# Patient Record
Sex: FEMALE | Race: BLACK OR AFRICAN AMERICAN | NOT HISPANIC OR LATINO | URBAN - METROPOLITAN AREA
[De-identification: names, ages, dates, MRNs, and addresses within clinical notes are randomized per-mention and may not be internally consistent; named-entity substitution may affect disease eponyms.]

---

## 2022-12-30 ENCOUNTER — EMERGENCY (EMERGENCY)
Facility: HOSPITAL | Age: 42
LOS: 1 days | Discharge: HOME | End: 2022-12-30
Attending: EMERGENCY MEDICINE
Payer: COMMERCIAL

## 2022-12-30 VITALS
WEIGHT: 162.04 LBS | RESPIRATION RATE: 16 BRPM | HEART RATE: 96 BPM | TEMPERATURE: 98 F | OXYGEN SATURATION: 100 % | DIASTOLIC BLOOD PRESSURE: 73 MMHG | SYSTOLIC BLOOD PRESSURE: 110 MMHG | HEIGHT: 65 IN

## 2022-12-30 LAB
ALBUMIN SERPL ELPH-MCNC: 3.3 G/DL — SIGNIFICANT CHANGE UP (ref 3.3–5.2)
ALP SERPL-CCNC: 108 U/L — SIGNIFICANT CHANGE UP (ref 40–120)
ALT FLD-CCNC: 12 U/L — SIGNIFICANT CHANGE UP
ANION GAP SERPL CALC-SCNC: 11 MMOL/L — SIGNIFICANT CHANGE UP (ref 5–17)
APPEARANCE UR: CLEAR — SIGNIFICANT CHANGE UP
APTT BLD: 30.4 SEC — SIGNIFICANT CHANGE UP (ref 27.5–35.5)
AST SERPL-CCNC: 16 U/L — SIGNIFICANT CHANGE UP
BACTERIA # UR AUTO: ABNORMAL
BASOPHILS # BLD AUTO: 0.03 K/UL — SIGNIFICANT CHANGE UP (ref 0–0.2)
BASOPHILS NFR BLD AUTO: 0.3 % — SIGNIFICANT CHANGE UP (ref 0–2)
BILIRUB SERPL-MCNC: 0.3 MG/DL — LOW (ref 0.4–2)
BILIRUB UR-MCNC: NEGATIVE — SIGNIFICANT CHANGE UP
BLD GP AB SCN SERPL QL: SIGNIFICANT CHANGE UP
BUN SERPL-MCNC: 7.1 MG/DL — LOW (ref 8–20)
CALCIUM SERPL-MCNC: 9 MG/DL — SIGNIFICANT CHANGE UP (ref 8.4–10.5)
CHLORIDE SERPL-SCNC: 97 MMOL/L — SIGNIFICANT CHANGE UP (ref 96–108)
CO2 SERPL-SCNC: 25 MMOL/L — SIGNIFICANT CHANGE UP (ref 22–29)
COLOR SPEC: YELLOW — SIGNIFICANT CHANGE UP
CREAT SERPL-MCNC: 0.6 MG/DL — SIGNIFICANT CHANGE UP (ref 0.5–1.3)
DIFF PNL FLD: ABNORMAL
EGFR: 116 ML/MIN/1.73M2 — SIGNIFICANT CHANGE UP
EOSINOPHIL # BLD AUTO: 0 K/UL — SIGNIFICANT CHANGE UP (ref 0–0.5)
EOSINOPHIL NFR BLD AUTO: 0 % — SIGNIFICANT CHANGE UP (ref 0–6)
EPI CELLS # UR: SIGNIFICANT CHANGE UP
GLUCOSE SERPL-MCNC: 88 MG/DL — SIGNIFICANT CHANGE UP (ref 70–99)
GLUCOSE UR QL: NEGATIVE MG/DL — SIGNIFICANT CHANGE UP
HCG UR QL: NEGATIVE — SIGNIFICANT CHANGE UP
HCT VFR BLD CALC: 26.4 % — LOW (ref 34.5–45)
HGB BLD-MCNC: 8.7 G/DL — LOW (ref 11.5–15.5)
IMM GRANULOCYTES NFR BLD AUTO: 0.6 % — SIGNIFICANT CHANGE UP (ref 0–0.9)
INR BLD: 1.36 RATIO — HIGH (ref 0.88–1.16)
KETONES UR-MCNC: NEGATIVE — SIGNIFICANT CHANGE UP
LEUKOCYTE ESTERASE UR-ACNC: NEGATIVE — SIGNIFICANT CHANGE UP
LYMPHOCYTES # BLD AUTO: 1.36 K/UL — SIGNIFICANT CHANGE UP (ref 1–3.3)
LYMPHOCYTES # BLD AUTO: 13 % — SIGNIFICANT CHANGE UP (ref 13–44)
MCHC RBC-ENTMCNC: 29.2 PG — SIGNIFICANT CHANGE UP (ref 27–34)
MCHC RBC-ENTMCNC: 33 GM/DL — SIGNIFICANT CHANGE UP (ref 32–36)
MCV RBC AUTO: 88.6 FL — SIGNIFICANT CHANGE UP (ref 80–100)
MONOCYTES # BLD AUTO: 0.6 K/UL — SIGNIFICANT CHANGE UP (ref 0–0.9)
MONOCYTES NFR BLD AUTO: 5.7 % — SIGNIFICANT CHANGE UP (ref 2–14)
NEUTROPHILS # BLD AUTO: 8.45 K/UL — HIGH (ref 1.8–7.4)
NEUTROPHILS NFR BLD AUTO: 80.4 % — HIGH (ref 43–77)
NITRITE UR-MCNC: NEGATIVE — SIGNIFICANT CHANGE UP
PH UR: 8 — SIGNIFICANT CHANGE UP (ref 5–8)
PLATELET # BLD AUTO: 492 K/UL — HIGH (ref 150–400)
POTASSIUM SERPL-MCNC: 4 MMOL/L — SIGNIFICANT CHANGE UP (ref 3.5–5.3)
POTASSIUM SERPL-SCNC: 4 MMOL/L — SIGNIFICANT CHANGE UP (ref 3.5–5.3)
PROT SERPL-MCNC: 8.4 G/DL — SIGNIFICANT CHANGE UP (ref 6.6–8.7)
PROT UR-MCNC: 15
PROTHROM AB SERPL-ACNC: 15.8 SEC — HIGH (ref 10.5–13.4)
RBC # BLD: 2.98 M/UL — LOW (ref 3.8–5.2)
RBC # FLD: 14.4 % — SIGNIFICANT CHANGE UP (ref 10.3–14.5)
RBC CASTS # UR COMP ASSIST: SIGNIFICANT CHANGE UP /HPF (ref 0–4)
SARS-COV-2 RNA SPEC QL NAA+PROBE: SIGNIFICANT CHANGE UP
SODIUM SERPL-SCNC: 133 MMOL/L — LOW (ref 135–145)
SP GR SPEC: 1.01 — SIGNIFICANT CHANGE UP (ref 1.01–1.02)
UROBILINOGEN FLD QL: 4 MG/DL
WBC # BLD: 10.5 K/UL — SIGNIFICANT CHANGE UP (ref 3.8–10.5)
WBC # FLD AUTO: 10.5 K/UL — SIGNIFICANT CHANGE UP (ref 3.8–10.5)
WBC UR QL: SIGNIFICANT CHANGE UP /HPF (ref 0–5)

## 2022-12-30 PROCEDURE — 99284 EMERGENCY DEPT VISIT MOD MDM: CPT

## 2022-12-30 PROCEDURE — 76830 TRANSVAGINAL US NON-OB: CPT | Mod: 26

## 2022-12-30 PROCEDURE — 76856 US EXAM PELVIC COMPLETE: CPT | Mod: 26,59

## 2022-12-30 RX ORDER — SODIUM CHLORIDE 9 MG/ML
1000 INJECTION INTRAMUSCULAR; INTRAVENOUS; SUBCUTANEOUS ONCE
Refills: 0 | Status: COMPLETED | OUTPATIENT
Start: 2022-12-30 | End: 2022-12-30

## 2022-12-30 RX ORDER — ONDANSETRON 8 MG/1
4 TABLET, FILM COATED ORAL ONCE
Refills: 0 | Status: COMPLETED | OUTPATIENT
Start: 2022-12-30 | End: 2022-12-30

## 2022-12-30 RX ORDER — KETOROLAC TROMETHAMINE 30 MG/ML
30 SYRINGE (ML) INJECTION ONCE
Refills: 0 | Status: DISCONTINUED | OUTPATIENT
Start: 2022-12-30 | End: 2022-12-30

## 2022-12-30 RX ORDER — PIPERACILLIN AND TAZOBACTAM 4; .5 G/20ML; G/20ML
3.38 INJECTION, POWDER, LYOPHILIZED, FOR SOLUTION INTRAVENOUS ONCE
Refills: 0 | Status: COMPLETED | OUTPATIENT
Start: 2022-12-30 | End: 2022-12-30

## 2022-12-30 RX ORDER — MORPHINE SULFATE 50 MG/1
4 CAPSULE, EXTENDED RELEASE ORAL ONCE
Refills: 0 | Status: DISCONTINUED | OUTPATIENT
Start: 2022-12-30 | End: 2022-12-30

## 2022-12-30 RX ORDER — ACETAMINOPHEN 500 MG
650 TABLET ORAL ONCE
Refills: 0 | Status: COMPLETED | OUTPATIENT
Start: 2022-12-30 | End: 2022-12-30

## 2022-12-30 RX ADMIN — ONDANSETRON 4 MILLIGRAM(S): 8 TABLET, FILM COATED ORAL at 20:30

## 2022-12-30 RX ADMIN — Medication 650 MILLIGRAM(S): at 20:41

## 2022-12-30 RX ADMIN — Medication 30 MILLIGRAM(S): at 23:46

## 2022-12-30 RX ADMIN — MORPHINE SULFATE 4 MILLIGRAM(S): 50 CAPSULE, EXTENDED RELEASE ORAL at 20:20

## 2022-12-30 RX ADMIN — SODIUM CHLORIDE 1000 MILLILITER(S): 9 INJECTION INTRAMUSCULAR; INTRAVENOUS; SUBCUTANEOUS at 20:30

## 2022-12-30 RX ADMIN — Medication 650 MILLIGRAM(S): at 22:54

## 2022-12-30 RX ADMIN — PIPERACILLIN AND TAZOBACTAM 3.38 GRAM(S): 4; .5 INJECTION, POWDER, LYOPHILIZED, FOR SOLUTION INTRAVENOUS at 20:28

## 2022-12-30 RX ADMIN — SODIUM CHLORIDE 1000 MILLILITER(S): 9 INJECTION INTRAMUSCULAR; INTRAVENOUS; SUBCUTANEOUS at 16:14

## 2022-12-30 RX ADMIN — MORPHINE SULFATE 4 MILLIGRAM(S): 50 CAPSULE, EXTENDED RELEASE ORAL at 16:14

## 2022-12-30 RX ADMIN — PIPERACILLIN AND TAZOBACTAM 200 GRAM(S): 4; .5 INJECTION, POWDER, LYOPHILIZED, FOR SOLUTION INTRAVENOUS at 17:00

## 2022-12-30 RX ADMIN — ONDANSETRON 4 MILLIGRAM(S): 8 TABLET, FILM COATED ORAL at 22:57

## 2022-12-30 NOTE — ED STATDOCS - PROGRESS NOTE DETAILS
MAUDE- seen by intake doc, agree with plan, seen by obgyn team, awaiting us POLO- Seen by obgyn team, us appears well, expressed concerns as outpatient ct showed multiple abscess of the llq, will obtain ct with iv contrast in order to further evaluate the abscess as unable to obtain outpt ct scan SUKHDEEP BALDWIN: sign out SUKHDEEP SANTORO   pt with still mild lower abdominal pain, CT results reviewed with patient, no abscess seen on CT, trace ascites to lower pelvic area ? rupture cyst. advised on need for close follow up with OBGYN and advised on strict return precautions, pt verbalizes understanding

## 2022-12-30 NOTE — ED ADULT NURSE REASSESSMENT NOTE - NS ED NURSE REASSESS COMMENT FT1
Assumed care of pt at 2315, POC reviewed. Pt with slight abd pain, denies HA dizziness, sob, N/V/D, chills, CP, palpitations at this time. Pt AOx4, speaking coherently, respirations even and unlabored on RA, skin warm and dry.

## 2022-12-30 NOTE — CONSULT NOTE ADULT - SUBJECTIVE AND OBJECTIVE BOX
MYLENE ORONA is a 42y  who presented to the ED for for evaluation in the setting of LLQ pain and CT finding of possible collection int eh left adnexa, reactive lymph nodes, and a 3cm dermoid cyst. Patient has been seen at both [facility] and [facility] this month for evaluation for the abdominal pain that has been associated with heavier periods. LMP 22. [facility] and was sent for CT for evaluation of the pain due to acute worsening per patient. The patient denies any associated fevers chills, nausea, vomiting She notes that she did not take any pain medication at home before coming in; however, she did just run out of the oxycodone that was prescribed at [facility] previously for the pain. The  GYN consulted for further evaluation of cyst vs. abscess.      OB history:  x3, PE in prior pregnancy in  (no longer on AC)  GYN history: LMP 22, + fibroids, denies STD hx, "inflammatory cells" on recent pap smear, sexually active with 1 male partner  Past medical history: anemia  Past surgical history: denies  Medications: IV iron infusions  Allergies: nkda    Physical Exam  T(C): 36.8 (22 @ 13:27), Max: 36.8 (-22 @ 13:27)  HR: 96 (-30-22 @ 13:27) (96 - 96)  BP: 110/73 (-30-22 @ 13:27) (110/73 - 110/73)  RR: 16 (-30-22 @ 13:27) (16 - 16)  SpO2: 100% (-30-22 @ 13:27) (100% - 100%)    General: alert and oriented x3, no acute distress  Abdominal: no distension, no scarring, no bruising.  Mild tender to deep palpation LLQ. No guarding, no rebound tenderness.  Pelvic: normal external genitalia, no active vaginal bleeding, blood in vaginal vault (menses started yesterday), no malodorous discharge No adnexal massess appreciated on exam, no CVA tenderness          Labs:                        8.7    10.50 )-----------( 492      ( 30 Dec 2022 16:17 )             26.4         133<L>  |  97  |  7.1<L>  ----------------------------<  88  4.0   |  25.0  |  0.60    Ca    9.0      30 Dec 2022 16:17    TPro  8.4  /  Alb  3.3  /  TBili  0.3<L>  /  DBili  x   /  AST  16  /  ALT  12  /  AlkPhos  108  12-30    PT/INR - ( 30 Dec 2022 16:17 )   PT: 15.8 sec;   INR: 1.36 ratio         PTT - ( 30 Dec 2022 16:17 )  PTT:30.4 sec

## 2022-12-30 NOTE — ED STATDOCS - CLINICAL SUMMARY MEDICAL DECISION MAKING FREE TEXT BOX
Pt with lower abdominal pain, vaginal bleeding with diagnosed fibroid sent in by Dr. Roberto Pitt for palpable mass on left of abdomen. Will check labs, CT scan, and US.

## 2022-12-30 NOTE — ED STATDOCS - NSFOLLOWUPINSTRUCTIONS_ED_ALL_ED_FT
please follow with OBGYN closely   bring all results with you   new or worsening symptoms return to the ED including and not limited to fevers chills, worsening abdominal pain bloating changes to bowel movments, vaginal bleeding or discharge.   you are also anemic, please resume daily iron supplementation     Abdominal Pain    Many things can cause abdominal pain. Many times, abdominal pain is not caused by a disease and will improve without treatment. Your health care provider will do a physical exam to determine if there is a dangerous cause of your pain; blood tests and imaging may help determine the cause of your pain. However, in many cases, no cause may be found and you may need further testing as an outpatient. Monitor your abdominal pain for any changes.     SEEK IMMEDIATE MEDICAL CARE IF YOU HAVE ANY OF THE FOLLOWING SYMPTOMS: worsening abdominal pain, uncontrollable vomiting, profuse diarrhea, inability to have bowel movements or pass gas, black or bloody stools, fever accompanying chest pain or back pain, or fainting. These symptoms may represent a serious problem that is an emergency. Do not wait to see if the symptoms will go away. Get medical help right away. Call 911 and do not drive yourself to the hospital.    Anemia    Anemia is a condition in which the concentration of red blood cells or hemoglobin in the blood is below normal. Hemoglobin is a substance in red blood cells that carries oxygen to the tissues of the body. Anemia results in not enough oxygen reaching these tissues which can cause symptoms such as weakness, dizziness/lightheadedness, shortness of breath, chest pain, paleness, or nausea. The cause of your anemia may or may not be determined immediately. If your hemoglobin was dangerously low, you may have received a blood transfusion. Usually reactions to transfusions occur immediately but monitor yourself for any fevers, rash, or shortness of breath.    SEEK IMMEDIATE MEDICAL CARE IF YOU HAVE ANY OF THE FOLLOWING SYMPTOMS: extreme weakness/chest pain/shortness of breath, black or bloody stools, vomiting blood, fainting, fever, or any signs of dehydration.

## 2022-12-30 NOTE — CONSULT NOTE ADULT - ASSESSMENT
MYLENE ORONA is a 42y  who presented to the ED for for evaluation in the setting of LLQ pain and CT finding of possible collection int eh left adnexa, reactive lymph nodes, and a 3cm dermoid cyst.     - VSS; afebrile  - WBC 10.5 - TOA unlikely - do not recommend continuation of ZOsyn  - ovarian cyst <5cm; less likely to cause torsion  - Clinical picture not convincing for torsion; however, will f/u TVUS to confirm retained flow to ovaries  - Do not recommend CT; one already done today and have report  - Pending TVUS; patient can follow up with outpt gyn for further management    D/w Dr. Rodriguez    MYLENE ORONA is a 42y  who presented to the ED for for evaluation in the setting of LLQ pain and CT finding of possible collection int eh left adnexa, reactive lymph nodes, and a 3cm dermoid cyst.     - VSS; afebrile  - WBC 10.5 - TOA unlikely - do not recommend continuation of ZOsyn  - ovarian cyst <5cm; less likely to cause torsion  - Clinical picture not convincing for torsion; however, will f/u TVUS to confirm retained flow to ovaries  - Do not recommend CT; one already done today and have report  - Pending TVUS; patient can follow up with outpt gyn for further management    D/w Dr. Rodriguez       ------------22 @ 20:52    TVUS resulted, consistent with known ovarian cyst <5cm  Dispo: return precautions provided, patient to follow up with outpt gyn for further management of ovarian cyst; rest of care per E.D. team   MYLENE ORONA is a 42y  who presented to the ED for for evaluation in the setting of LLQ pain and CT finding of possible collection int eh left adnexa, reactive lymph nodes, and a 3cm dermoid cyst.     - VSS; afebrile  - WBC 10.5 - TOA unlikely - do not recommend continuation of ZOsyn  - ovarian cyst <5cm; less likely to cause torsion  - Clinical picture not convincing for torsion; however, will f/u TVUS to confirm retained flow to ovaries  - Do not recommend CT; one already done today and have report  - Pending TVUS; patient can follow up with outpt gyn for further management    D/w Dr. Rodriguez       ------------22 @ 20:52    TVUS resulted, consistent with known ovarian cyst <5cm  repeat CT shows trace ascites, possibly ruptured ovarian cyst  VSS  Dispo: return precautions provided, patient to follow up with outpt gyn for further management of ovarian cyst; rest of care per E.D. team

## 2022-12-30 NOTE — ED ADULT TRIAGE NOTE - CHIEF COMPLAINT QUOTE
"I was sent in by an obgyn for an ovarian cyst. that I need to see some doctor her for something. to be honest he was talking so fast and none of it made sense so Im not really sure why I am here."

## 2022-12-30 NOTE — ED ADULT NURSE NOTE - OBJECTIVE STATEMENT
pt received in [facility].  Patient A&Ox4 complaining of L pelvic region pain. pt with bleeding and pain since september with IUD.  Was told she has a fibroid, [facility] Had ct this morning and was told she has fobroids, but also heard them say "cancer". No bleeding at this time.  NAD noted, respirations even and unlabored.  Safety precautions in place.  Plan of care explained, pt verbalized understanding.

## 2022-12-30 NOTE — ED ADULT NURSE NOTE - CAS EDP DISCH TYPE
Bedside report received from NOC RN. Assumed care of pt. Pt awake, laying in bed. A/Ox4, VSS. No concerns, complaints or distress. Pt educated to call before getting out of bed. POC reviewed and white board updated. Tele box on. SR 69 on the monitor. Call light in reach. Bed locked in lowest position with 2 upper bed rails up. Bed alarm on.     uto

## 2022-12-30 NOTE — ED STATDOCS - ATTENDING APP SHARED VISIT CONTRIBUTION OF CARE
I, Huong Bennett, performed the initial face to face bedside interview with this patient regarding history of present illness, review of symptoms and relevant past medical, social and family history.  I completed an independent physical examination.  I was the initial provider who evaluated this patient. I have signed out the follow up of any pending tests (i.e. labs, radiological studies) to the ACP.  I have communicated the patient’s plan of care and disposition with the ACP.  The history, relevant review of systems, past medical and surgical history, medical decision making, and physical examination was documented by the scribe in my presence and I attest to the accuracy of the documentation.

## 2022-12-30 NOTE — ED STATDOCS - PATIENT PORTAL LINK FT
You can access the FollowMyHealth Patient Portal offered by [facility] by registering at the following website: [email or web address]. By joining [facility]’s FollowMyHealth portal, you will also be able to view your health information using other applications (apps) compatible with our system.

## 2022-12-30 NOTE — ED STATDOCS - OBJECTIVE STATEMENT
41 y/o female with PMHx of Fibroids presents to the ED s/p [facility] with diagnosed ovarian cyst, sent in by Dr. Roberto Pitt from OB/GYN to meet up with GYN team. Pt unsure of why she is here. Per pt, reports vaginal bleeding and lower abdominal pain since September. Diagnosed with a fibroid via internal exam and pelvic exam. Recently removed off of birth control. 41 y/o female with PMHx of Fibroids presents to the ED s/p CT scan [facility] with diagnosed 4mm ovarian cyst at left adnexa, sent in by Dr. Roberto Pitt from OB/GYN to meet up with GYN team. Pt unsure of why she is here. Per pt, reports vaginal bleeding and lower abdominal pain since September. Diagnosed with a fibroid via internal exam and pelvic exam. Recently removed off of birth control. 41 y/o female with PMHx of Fibroids presents to the ED s/p CT scan [facility] with diagnosed abscess at left adnexa, sent in by Dr. Roberto Pitt from OB/GYN to meet up with GYN team. Pt unsure of why she is here. Per pt, reports vaginal bleeding and lower abdominal pain since September. Diagnosed with a fibroid via internal exam and pelvic exam. Recently removed off of birth control. 43 y/o female with PMHx of Fibroids presents to the ED s/p CT scan today at the [facility] [place] with diagnosed abscess at left adnexa, sent in by Dr. Roberto Pitt from OB/GYN to meet up with GYN team. Pt unsure of why she is here. Per pt, reports vaginal bleeding and lower abdominal pain since September. Diagnosed with a fibroid via internal exam and pelvic exam. Recently removed off of birth control.

## 2022-12-31 VITALS
DIASTOLIC BLOOD PRESSURE: 68 MMHG | RESPIRATION RATE: 18 BRPM | SYSTOLIC BLOOD PRESSURE: 102 MMHG | HEART RATE: 83 BPM | OXYGEN SATURATION: 98 %

## 2022-12-31 LAB
C TRACH RRNA SPEC QL NAA+PROBE: SIGNIFICANT CHANGE UP
CANDIDA AB TITR SER: SIGNIFICANT CHANGE UP
G VAGINALIS DNA SPEC QL NAA+PROBE: DETECTED
N GONORRHOEA RRNA SPEC QL NAA+PROBE: SIGNIFICANT CHANGE UP
SPECIMEN SOURCE: SIGNIFICANT CHANGE UP
T VAGINALIS SPEC QL WET PREP: SIGNIFICANT CHANGE UP

## 2022-12-31 PROCEDURE — 87800 DETECT AGNT MULT DNA DIREC: CPT

## 2022-12-31 PROCEDURE — 76830 TRANSVAGINAL US NON-OB: CPT

## 2022-12-31 PROCEDURE — U0003: CPT

## 2022-12-31 PROCEDURE — 80053 COMPREHEN METABOLIC PANEL: CPT

## 2022-12-31 PROCEDURE — 81025 URINE PREGNANCY TEST: CPT

## 2022-12-31 PROCEDURE — 36415 COLL VENOUS BLD VENIPUNCTURE: CPT

## 2022-12-31 PROCEDURE — 85610 PROTHROMBIN TIME: CPT

## 2022-12-31 PROCEDURE — 76856 US EXAM PELVIC COMPLETE: CPT

## 2022-12-31 PROCEDURE — 74177 CT ABD & PELVIS W/CONTRAST: CPT | Mod: 26,MA

## 2022-12-31 PROCEDURE — 86850 RBC ANTIBODY SCREEN: CPT

## 2022-12-31 PROCEDURE — 99284 EMERGENCY DEPT VISIT MOD MDM: CPT

## 2022-12-31 PROCEDURE — 87491 CHLMYD TRACH DNA AMP PROBE: CPT

## 2022-12-31 PROCEDURE — 85025 COMPLETE CBC W/AUTO DIFF WBC: CPT

## 2022-12-31 PROCEDURE — 86901 BLOOD TYPING SEROLOGIC RH(D): CPT

## 2022-12-31 PROCEDURE — 85730 THROMBOPLASTIN TIME PARTIAL: CPT

## 2022-12-31 PROCEDURE — 81001 URINALYSIS AUTO W/SCOPE: CPT

## 2022-12-31 PROCEDURE — U0005: CPT

## 2022-12-31 PROCEDURE — 74177 CT ABD & PELVIS W/CONTRAST: CPT | Mod: MA

## 2022-12-31 PROCEDURE — 87591 N.GONORRHOEAE DNA AMP PROB: CPT

## 2022-12-31 PROCEDURE — 86900 BLOOD TYPING SEROLOGIC ABO: CPT

## 2022-12-31 RX ADMIN — SODIUM CHLORIDE 1000 MILLILITER(S): 9 INJECTION INTRAMUSCULAR; INTRAVENOUS; SUBCUTANEOUS at 00:01

## 2022-12-31 NOTE — ED ADULT NURSE REASSESSMENT NOTE - NS ED NURSE REASSESS COMMENT FT1
Pt sleeping, arouses to voice, Aox4, speaking coherently, respirations even and unlabored on RA, skin warm and dry. Pt denies HA, dizziness, SOB, N/V/D, pain, CP, palpitations at this time. Pt awaiting CT.

## 2022-12-31 NOTE — ED ADULT NURSE REASSESSMENT NOTE - NS ED NURSE REASSESS COMMENT FT1
Pt discharged by provider, pt stated she cannot get ahold of anyone for a ride. Pt remains in RW 10. AOx4, speaking coherently, respirations even and unlabored on RA, skin warm and dry. Pt with no new complaints at this time.

## 2023-09-11 NOTE — ED STATDOCS - CARDIAC, MLM
normal rate, regular rhythm, and no murmur. Sski Pregnancy And Lactation Text: This medication is Pregnancy Category D and isn't considered safe during pregnancy. It is excreted in breast milk.
